# Patient Record
Sex: FEMALE | Race: WHITE | Employment: UNEMPLOYED | URBAN - METROPOLITAN AREA
[De-identification: names, ages, dates, MRNs, and addresses within clinical notes are randomized per-mention and may not be internally consistent; named-entity substitution may affect disease eponyms.]

---
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
## 2024-07-01 ENCOUNTER — APPOINTMENT (OUTPATIENT)
Dept: PRIMARY CARE | Facility: CLINIC | Age: 19
End: 2024-07-01
Payer: COMMERCIAL

## 2024-07-01 VITALS
OXYGEN SATURATION: 96 % | TEMPERATURE: 97.8 F | BODY MASS INDEX: 21.9 KG/M2 | RESPIRATION RATE: 18 BRPM | WEIGHT: 119 LBS | SYSTOLIC BLOOD PRESSURE: 108 MMHG | HEART RATE: 76 BPM | HEIGHT: 62 IN | DIASTOLIC BLOOD PRESSURE: 70 MMHG

## 2024-07-01 DIAGNOSIS — E44.0 MODERATE PROTEIN-CALORIE MALNUTRITION (MULTI): ICD-10-CM

## 2024-07-01 DIAGNOSIS — F41.9 ANXIETY AND DEPRESSION: ICD-10-CM

## 2024-07-01 DIAGNOSIS — F32.A ANXIETY AND DEPRESSION: ICD-10-CM

## 2024-07-01 DIAGNOSIS — F32.A DEPRESSION, UNSPECIFIED: ICD-10-CM

## 2024-07-01 DIAGNOSIS — Z00.00 ANNUAL PHYSICAL EXAM: Primary | ICD-10-CM

## 2024-07-01 DIAGNOSIS — R53.83 OTHER FATIGUE: ICD-10-CM

## 2024-07-01 DIAGNOSIS — F41.9 ANXIETY DISORDER, UNSPECIFIED: ICD-10-CM

## 2024-07-01 PROCEDURE — 1036F TOBACCO NON-USER: CPT | Performed by: PHYSICIAN ASSISTANT

## 2024-07-01 PROCEDURE — 99395 PREV VISIT EST AGE 18-39: CPT | Performed by: PHYSICIAN ASSISTANT

## 2024-07-01 RX ORDER — SPIRONOLACTONE 25 MG/1
1 TABLET ORAL DAILY
COMMUNITY
Start: 2024-06-13

## 2024-07-01 RX ORDER — CITALOPRAM 20 MG/1
20 TABLET, FILM COATED ORAL DAILY
Qty: 90 TABLET | Refills: 1 | Status: SHIPPED | OUTPATIENT
Start: 2024-07-01

## 2024-07-01 ASSESSMENT — PROMIS GLOBAL HEALTH SCALE
RATE_AVERAGE_FATIGUE: SEVERE
RATE_MENTAL_HEALTH: POOR
RATE_PHYSICAL_HEALTH: GOOD
RATE_GENERAL_HEALTH: FAIR
RATE_AVERAGE_PAIN: 5
RATE_QUALITY_OF_LIFE: FAIR
RATE_SOCIAL_SATISFACTION: VERY GOOD
CARRYOUT_PHYSICAL_ACTIVITIES: MOSTLY
EMOTIONAL_PROBLEMS: ALWAYS
CARRYOUT_SOCIAL_ACTIVITIES: FAIR

## 2024-07-01 ASSESSMENT — ENCOUNTER SYMPTOMS: FATIGUE: 1
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
## 2024-07-01 NOTE — ASSESSMENT & PLAN NOTE
- Pt tells me of a long history of poor appetite. First started as a child. 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 She states this has improved with age but she continues to have no appetite  - She eats one meal daily and will often have stomach ache after this   - Fortunately, no significant weight loss upon review of the records  - H/o EGD - revealed GERD   - First, will check some baseline labs including eval for hyperthyroidism, kyle with reflex ivana based on family hx   - Would recommend restart PPI for 2 week course if labs unrevealing   - Consider gastritis vs. PUD vs. Hpylori if suboptimal improvement and consider GI referral at that time.

## 2024-07-01 NOTE — ASSESSMENT & PLAN NOTE
PREVENTIVE CARE SCREENING:  - Labs/ Lipid screen:  UTD    - PAP: n/a   - Tdap: UTD   - Discussed DIET - suboptimal - admits to very poor dietary habits, only eating one meal daily and not always nutritious   - Discussed EXERCISE - Pilates or yoga 3 days a week   - Encouraged pt to get yearly eye and dental exams  - Avoid cigarette smoking.   - Limit alcohol consumption.

## 2024-07-01 NOTE — ASSESSMENT & PLAN NOTE
- Pt feels her mental health has improved with the medicine and her coping strategies  - She would like to continue current tx plan

## 2024-07-02 ENCOUNTER — LAB (OUTPATIENT)
Dept: LAB | Facility: LAB | Age: 19
End: 2024-07-02
Payer: COMMERCIAL

## 2024-07-02 DIAGNOSIS — Z00.00 ANNUAL PHYSICAL EXAM: ICD-10-CM

## 2024-07-02 DIAGNOSIS — R53.83 OTHER FATIGUE: ICD-10-CM

## 2024-07-02 DIAGNOSIS — E44.0 MODERATE PROTEIN-CALORIE MALNUTRITION (MULTI): ICD-10-CM

## 2024-07-02 LAB
25(OH)D3 SERPL-MCNC: 53 NG/ML (ref 30–100)
ALBUMIN SERPL BCP-MCNC: 4.5 G/DL (ref 3.4–5)
ALP SERPL-CCNC: 37 U/L (ref 33–110)
ALT SERPL W P-5'-P-CCNC: 18 U/L (ref 7–45)
ANION GAP SERPL CALC-SCNC: 12 MMOL/L (ref 10–20)
AST SERPL W P-5'-P-CCNC: 16 U/L (ref 9–39)
BILIRUB SERPL-MCNC: 0.9 MG/DL (ref 0–1.2)
BUN SERPL-MCNC: 8 MG/DL (ref 6–23)
CALCIUM SERPL-MCNC: 9.9 MG/DL (ref 8.6–10.3)
CHLORIDE SERPL-SCNC: 102 MMOL/L (ref 98–107)
CHOLEST SERPL-MCNC: 189 MG/DL (ref 0–199)
CHOLESTEROL/HDL RATIO: 2.6
CO2 SERPL-SCNC: 28 MMOL/L (ref 21–32)
CREAT SERPL-MCNC: 0.66 MG/DL (ref 0.5–1.05)
EGFRCR SERPLBLD CKD-EPI 2021: >90 ML/MIN/1.73M*2
ERYTHROCYTE [DISTWIDTH] IN BLOOD BY AUTOMATED COUNT: 11.6 % (ref 11.5–14.5)
FERRITIN SERPL-MCNC: 33 NG/ML (ref 8–150)
FOLATE SERPL-MCNC: 11.1 NG/ML
GLUCOSE SERPL-MCNC: 79 MG/DL (ref 74–99)
HCT VFR BLD AUTO: 38.4 % (ref 36–46)
HDLC SERPL-MCNC: 72.1 MG/DL
HGB BLD-MCNC: 13.3 G/DL (ref 12–16)
IRON SATN MFR SERPL: 27 % (ref 25–45)
IRON SERPL-MCNC: 116 UG/DL (ref 35–150)
LDLC SERPL CALC-MCNC: 105 MG/DL
MAGNESIUM SERPL-MCNC: 1.71 MG/DL (ref 1.6–2.4)
MCH RBC QN AUTO: 30.4 PG (ref 26–34)
MCHC RBC AUTO-ENTMCNC: 34.6 G/DL (ref 32–36)
MCV RBC AUTO: 88 FL (ref 80–100)
NON HDL CHOLESTEROL: 117 MG/DL (ref 0–119)
NRBC BLD-RTO: 0 /100 WBCS (ref 0–0)
PLATELET # BLD AUTO: 345 X10*3/UL (ref 150–450)
POTASSIUM SERPL-SCNC: 3.8 MMOL/L (ref 3.5–5.3)
PROT SERPL-MCNC: 7.2 G/DL (ref 6.4–8.2)
RBC # BLD AUTO: 4.37 X10*6/UL (ref 4–5.2)
SODIUM SERPL-SCNC: 138 MMOL/L (ref 136–145)
TIBC SERPL-MCNC: 433 UG/DL (ref 240–445)
TRIGL SERPL-MCNC: 61 MG/DL (ref 0–149)
TSH SERPL-ACNC: 1.41 MIU/L (ref 0.44–3.98)
UIBC SERPL-MCNC: 317 UG/DL (ref 110–370)
VIT B12 SERPL-MCNC: 181 PG/ML (ref 211–911)
VLDL: 12 MG/DL (ref 0–40)
WBC # BLD AUTO: 7.1 X10*3/UL (ref 4.4–11.3)

## 2024-07-02 PROCEDURE — 83735 ASSAY OF MAGNESIUM: CPT

## 2024-07-02 PROCEDURE — 80061 LIPID PANEL: CPT

## 2024-07-02 PROCEDURE — 80053 COMPREHEN METABOLIC PANEL: CPT

## 2024-07-02 PROCEDURE — 82746 ASSAY OF FOLIC ACID SERUM: CPT

## 2024-07-02 PROCEDURE — 86038 ANTINUCLEAR ANTIBODIES: CPT

## 2024-07-02 PROCEDURE — 83540 ASSAY OF IRON: CPT

## 2024-07-02 PROCEDURE — 36415 COLL VENOUS BLD VENIPUNCTURE: CPT

## 2024-07-02 PROCEDURE — 82728 ASSAY OF FERRITIN: CPT

## 2024-07-02 PROCEDURE — 82306 VITAMIN D 25 HYDROXY: CPT

## 2024-07-02 PROCEDURE — 85027 COMPLETE CBC AUTOMATED: CPT

## 2024-07-02 PROCEDURE — 82607 VITAMIN B-12: CPT

## 2024-07-02 PROCEDURE — 83550 IRON BINDING TEST: CPT

## 2024-07-02 PROCEDURE — 84443 ASSAY THYROID STIM HORMONE: CPT

## 2024-07-05 LAB — ANA SER QL HEP2 SUBST: NEGATIVE

## 2024-08-27 ENCOUNTER — APPOINTMENT (OUTPATIENT)
Dept: PRIMARY CARE | Facility: CLINIC | Age: 19
End: 2024-08-27
Payer: COMMERCIAL

## 2024-09-03 DIAGNOSIS — L70.0 ACNE VULGARIS: Primary | ICD-10-CM

## 2024-09-03 DIAGNOSIS — F32.A DEPRESSION, UNSPECIFIED: ICD-10-CM

## 2024-09-03 DIAGNOSIS — F41.9 ANXIETY DISORDER, UNSPECIFIED: ICD-10-CM

## 2024-09-03 RX ORDER — CITALOPRAM 20 MG/1
20 TABLET, FILM COATED ORAL DAILY
Qty: 90 TABLET | Refills: 1 | Status: SHIPPED | OUTPATIENT
Start: 2024-09-03

## 2024-09-03 RX ORDER — NORGESTIMATE AND ETHINYL ESTRADIOL 7DAYSX3 LO
1 KIT ORAL DAILY
Qty: 28 TABLET | Refills: 1 | Status: SHIPPED | OUTPATIENT
Start: 2024-09-03

## 2024-09-03 RX ORDER — SPIRONOLACTONE 25 MG/1
25 TABLET ORAL DAILY
Qty: 90 TABLET | Refills: 1 | Status: SHIPPED | OUTPATIENT
Start: 2024-09-03

## 2024-09-03 NOTE — TELEPHONE ENCOUNTER
Pt states that she is out of state currently and is asking if you can send her refills on her celexa, spironolactone, and birth control sent to [facility] at [address] [place] [address] in [place].

## 2024-11-11 DIAGNOSIS — L70.0 ACNE VULGARIS: ICD-10-CM

## 2024-11-11 RX ORDER — NORGESTIMATE AND ETHINYL ESTRADIOL 7DAYSX3 LO
1 KIT ORAL DAILY
Qty: 28 TABLET | Refills: 1 | Status: SHIPPED | OUTPATIENT
Start: 2024-11-11

## 2025-03-25 DIAGNOSIS — F32.A DEPRESSION, UNSPECIFIED: ICD-10-CM

## 2025-03-25 DIAGNOSIS — F41.9 ANXIETY DISORDER, UNSPECIFIED: ICD-10-CM

## 2025-03-25 RX ORDER — CITALOPRAM 20 MG/1
20 TABLET, FILM COATED ORAL DAILY
Qty: 90 TABLET | Refills: 1 | Status: SHIPPED | OUTPATIENT
Start: 2025-03-25